# Patient Record
Sex: MALE | NOT HISPANIC OR LATINO | URBAN - METROPOLITAN AREA
[De-identification: names, ages, dates, MRNs, and addresses within clinical notes are randomized per-mention and may not be internally consistent; named-entity substitution may affect disease eponyms.]

---

## 2024-08-23 DIAGNOSIS — Z86.010 PERSONAL HISTORY OF COLONIC POLYPS: Primary | ICD-10-CM

## 2024-12-05 ENCOUNTER — TELEPHONE (OUTPATIENT)
Dept: GASTROENTEROLOGY | Facility: CLINIC | Age: 59
End: 2024-12-05

## 2024-12-05 VITALS — BODY MASS INDEX: 42.66 KG/M2 | WEIGHT: 315 LBS | HEIGHT: 72 IN

## 2024-12-05 DIAGNOSIS — Z86.0100 HISTORY OF COLON POLYPS: Primary | ICD-10-CM

## 2024-12-05 RX ORDER — AMLODIPINE BESYLATE 10 MG/1
10 TABLET ORAL
COMMUNITY
Start: 2024-10-10

## 2024-12-05 RX ORDER — SOD SULF/POT CHLORIDE/MAG SULF 1.479 G
TABLET ORAL
Qty: 24 TABLET | Refills: 0 | Status: CANCELLED | OUTPATIENT
Start: 2024-12-05

## 2024-12-05 RX ORDER — LABETALOL 200 MG/1
200 TABLET, FILM COATED ORAL 2 TIMES DAILY
COMMUNITY

## 2024-12-05 RX ORDER — SPIRONOLACTONE 50 MG/1
50 TABLET, FILM COATED ORAL
COMMUNITY
Start: 2024-10-14

## 2024-12-05 RX ORDER — TIRZEPATIDE 15 MG/.5ML
INJECTION, SOLUTION SUBCUTANEOUS
COMMUNITY
Start: 2024-11-07

## 2024-12-05 NOTE — TELEPHONE ENCOUNTER
----- Message from Kailey sent at 12/4/2024 12:38 PM CST -----  Regarding: Direct Schedule - New Pt NBP/Est Pt RMM - last colon 11.2020  Contact: José Luis Matamoros    ----- Message -----  From: Klaudia Tse MA  Sent: 12/4/2024  11:56 AM CST  To: Kailey Méndez  Subject: FW: Appointment                                    ----- Message -----  From: Twila Valdez  Sent: 12/3/2024   4:23 PM CST  To: Jelani CABALLERO Staff  Subject: Appointment                                      Per phone call with Shiloh,she stated that a referral was sent over to have the patient to be schedule for an appointment to see the physician regarding colonoscopy and polyps.  Please return call   [phone].    Thanks,  SJ

## 2024-12-05 NOTE — TELEPHONE ENCOUNTER
Patient's last colonoscopy was w/ RMM on 11/25/2020. Colon polyp. Diverticulosis coli. Internal hemorrhoids. Per [facility]'s report.    Patient denied having any current GI problems or issues. Also denied having any hx of seizures, sleep apnea, or kidney disease. Chart was reviewed and updated with patient. Prep instructions were also reviewed and will be mailed to patient's home address.    Colonoscopy scheduled on Wednesday November 26, 2025 with NBP at [facility]. DMP

## 2024-12-05 NOTE — TELEPHONE ENCOUNTER
----- Message from Kailey sent at 12/4/2024 12:38 PM CST -----  Regarding: Direct Schedule - New Pt NBP/Est Pt RMM - last colon 11.2020  Contact: José Luis Matamoros    ----- Message -----  From: Klaudia Tse MA  Sent: 12/4/2024  11:56 AM CST  To: Kailey Méndez  Subject: FW: Appointment                                    ----- Message -----  From: Twila Valdez  Sent: 12/3/2024   4:23 PM CST  To: Jelani CABALLERO Staff  Subject: Appointment                                      Per phone call with Shiloh,she stated that a referral was sent over to have the patient to be schedule for an appointment to see the physician regarding colonoscopy and polyps.  Please return call   [phone].    Thanks,  SJ

## 2025-07-01 ENCOUNTER — TELEPHONE (OUTPATIENT)
Dept: GASTROENTEROLOGY | Facility: CLINIC | Age: 60
End: 2025-07-01
Payer: COMMERCIAL

## 2025-07-01 NOTE — TELEPHONE ENCOUNTER
Called pt and left v/m that NBP will not be in the office on 11/26/25 -Wed and I need to move his colonoscopy. I moved pt to 12/2/25- Tues. I told pt to call me back if that date does not work for his schedule. Updated Epic. [facility]